# Patient Record
Sex: MALE | Race: WHITE | NOT HISPANIC OR LATINO | Employment: OTHER | URBAN - NONMETROPOLITAN AREA
[De-identification: names, ages, dates, MRNs, and addresses within clinical notes are randomized per-mention and may not be internally consistent; named-entity substitution may affect disease eponyms.]

---

## 2022-12-27 DIAGNOSIS — M25.561 PAIN IN BOTH KNEES, UNSPECIFIED CHRONICITY: Primary | ICD-10-CM

## 2022-12-27 DIAGNOSIS — M25.562 PAIN IN BOTH KNEES, UNSPECIFIED CHRONICITY: Primary | ICD-10-CM

## 2023-01-16 ENCOUNTER — HOSPITAL ENCOUNTER (OUTPATIENT)
Dept: GENERAL RADIOLOGY | Facility: HOSPITAL | Age: 68
Discharge: HOME | End: 2023-01-16
Admitting: ORTHOPAEDIC SURGERY
Payer: MEDICARE

## 2023-01-16 ENCOUNTER — OFFICE VISIT (OUTPATIENT)
Dept: ORTHOPEDIC SURGERY | Facility: CLINIC | Age: 68
End: 2023-01-16
Payer: MEDICARE

## 2023-01-16 VITALS
SYSTOLIC BLOOD PRESSURE: 123 MMHG | DIASTOLIC BLOOD PRESSURE: 69 MMHG | WEIGHT: 241.2 LBS | HEIGHT: 71 IN | BODY MASS INDEX: 33.77 KG/M2 | HEART RATE: 82 BPM

## 2023-01-16 DIAGNOSIS — M25.561 PAIN IN BOTH KNEES, UNSPECIFIED CHRONICITY: ICD-10-CM

## 2023-01-16 DIAGNOSIS — M17.0 PRIMARY OSTEOARTHRITIS OF BOTH KNEES: Primary | ICD-10-CM

## 2023-01-16 DIAGNOSIS — M25.562 PAIN IN BOTH KNEES, UNSPECIFIED CHRONICITY: ICD-10-CM

## 2023-01-16 PROCEDURE — 73562 X-RAY EXAM OF KNEE 3: CPT

## 2023-01-16 PROCEDURE — 20610 DRAIN/INJ JOINT/BURSA W/O US: CPT | Performed by: ORTHOPAEDIC SURGERY

## 2023-01-16 PROCEDURE — 99203 OFFICE O/P NEW LOW 30 MIN: CPT | Performed by: ORTHOPAEDIC SURGERY

## 2023-01-16 PROCEDURE — 73562 X-RAY EXAM OF KNEE 3: CPT | Performed by: RADIOLOGY

## 2023-01-16 RX ORDER — TRIAMTERENE AND HYDROCHLOROTHIAZIDE 37.5; 25 MG/1; MG/1
TABLET ORAL
COMMUNITY
Start: 2022-12-27

## 2023-01-16 RX ORDER — HYDROCODONE BITARTRATE AND ACETAMINOPHEN 10; 325 MG/1; MG/1
TABLET ORAL
COMMUNITY
Start: 2022-12-23

## 2023-01-16 RX ORDER — OMEPRAZOLE 40 MG/1
CAPSULE, DELAYED RELEASE ORAL
COMMUNITY
Start: 2022-12-20

## 2023-01-16 RX ORDER — AMLODIPINE BESYLATE 10 MG/1
TABLET ORAL
COMMUNITY
Start: 2022-12-20

## 2023-01-16 RX ORDER — TRAMADOL HYDROCHLORIDE 50 MG/1
TABLET ORAL
COMMUNITY
Start: 2022-12-20

## 2023-01-16 RX ORDER — LISINOPRIL 20 MG/1
TABLET ORAL
COMMUNITY
Start: 2023-01-02

## 2023-01-16 RX ORDER — GABAPENTIN 100 MG/1
CAPSULE ORAL
COMMUNITY
Start: 2022-11-15

## 2023-01-16 RX ADMIN — LIDOCAINE HYDROCHLORIDE 3 ML: 10 INJECTION, SOLUTION EPIDURAL; INFILTRATION; INTRACAUDAL; PERINEURAL at 12:01

## 2023-01-16 RX ADMIN — METHYLPREDNISOLONE ACETATE 80 MG: 80 INJECTION, SUSPENSION INTRA-ARTICULAR; INTRALESIONAL; INTRAMUSCULAR; SOFT TISSUE at 12:02

## 2023-01-16 RX ADMIN — METHYLPREDNISOLONE ACETATE 80 MG: 80 INJECTION, SUSPENSION INTRA-ARTICULAR; INTRALESIONAL; INTRAMUSCULAR; SOFT TISSUE at 12:01

## 2023-01-16 RX ADMIN — LIDOCAINE HYDROCHLORIDE 3 ML: 10 INJECTION, SOLUTION EPIDURAL; INFILTRATION; INTRACAUDAL; PERINEURAL at 12:02

## 2023-01-16 NOTE — PROGRESS NOTES
New Patient Visit      Patient: Bernardo Burnett  YOB: 1955  Date of Encounter: 01/16/2023        Chief Complaint:   Chief Complaint   Patient presents with   • Right Knee - Initial Evaluation, Pain   • Left Knee - Initial Evaluation, Pain           HPI:   Bernardo Burnett, 67 y.o. male, referred by Christine Carlson DO presents for evaluation of bilateral knee pain progressing over many years but became much worse this past summer while mowing grass his right knee buckled.  His pain was sharp and severe but has improved slightly.  He also has similar complaints to his left knee complaining of catching sensation without true locking.  His pain is worse with weightbearing.  He has received numerous steroid injections over the years with moderate response.  He does not experience weakness or numbness to either leg and denies back pain.  He has undergone left total hip replacement and is doing well.  Medical history includes diabetes and cancer involving his kidney which required nephrectomy.  Social history is remarkable for smokeless tobacco.  Alcohol is negative.        Active Problem List:  Patient Active Problem List   Diagnosis   • Primary osteoarthritis of both knees           Past Medical History:  Past Medical History:   Diagnosis Date   • Arthritis    • Back pain    • Cancer of kidney (HCC)    • Claustrophobia    • Diabetes (HCC)    • Hearing loss    • Hypertension            Past Surgical History:  Past Surgical History:   Procedure Laterality Date   • HIP SURGERY      replacement   • NEPHRECTOMY     • THYROIDECTOMY, PARTIAL             Family History:  Family History   Problem Relation Age of Onset   • Hypertension Mother    • Diabetes Father    • Skin cancer Father          Social History:  Social History     Socioeconomic History   • Marital status:    Tobacco Use   • Smoking status: Never   • Smokeless tobacco: Current     Types: Snuff   Vaping Use   • Vaping Use: Never used   Substance  "and Sexual Activity   • Alcohol use: Not Currently   • Drug use: Never   • Sexual activity: Never     Body mass index is 33.64 kg/m².      Medications:  Current Outpatient Medications   Medication Sig Dispense Refill   • amLODIPine (NORVASC) 10 MG tablet      • gabapentin (NEURONTIN) 100 MG capsule      • HYDROcodone-acetaminophen (NORCO)  MG per tablet      • lisinopril (PRINIVIL,ZESTRIL) 20 MG tablet      • metFORMIN (GLUCOPHAGE) 500 MG tablet      • omeprazole (priLOSEC) 40 MG capsule      • traMADol (ULTRAM) 50 MG tablet      • triamterene-hydrochlorothiazide (MAXZIDE-25) 37.5-25 MG per tablet        No current facility-administered medications for this visit.         Allergies:  No Known Allergies      Review of Systems:   Review of Systems   Constitutional: Positive for activity change and unexpected weight change.   HENT: Negative.    Eyes: Negative.    Respiratory: Positive for apnea.    Cardiovascular: Positive for palpitations and leg swelling.   Gastrointestinal: Negative.    Endocrine: Negative.    Genitourinary: Negative.    Musculoskeletal: Positive for arthralgias, back pain and neck pain.   Skin: Negative.    Allergic/Immunologic: Negative.    Neurological: Positive for numbness.   Hematological: Bruises/bleeds easily.   Psychiatric/Behavioral: Negative.          Physical Exam:   Physical Exam  GENERAL: 67 y.o. male, alert and oriented X 3 in no acute distress.   Visit Vitals  /69   Pulse 82   Ht 180.3 cm (71\")   Wt 109 kg (241 lb 3.2 oz)   BMI 33.64 kg/m²       GENERAL APPEARANCE: Awake, alert & oriented, in no acute distress and well developed, well nourished.   PSYCH: Normal mood and affect  LUNGS: Breathing nonlabored, no wheezing  EYES: Sclera anicteric, pupils equal  CARDIOVASCULAR: Palpable pulses. Capillary refill less than 2 seconds  INTEGUMENTARY: Skin intact, co clubbing, cyanosis  NEUROLOGIC: Normal Sensation      Musculoskeletal:   Examination bilateral knees reveals " approximately 8 degree flexion contractures bilaterally with moderate effusion left knee and mild effusion right knee has both medial and lateral joint line tenderness bilaterally both knees flex to approximately 110 degrees.  There is no gross instability with normal patellar tracking.  Neurovascular exam grossly intact.      Radiology/Labs:     XR Knee 3 View Bilateral    Result Date: 1/16/2023    Moderate to severe tricompartmental osteoarthritis.  This report was finalized on 1/16/2023 2:18 PM by Dr. Jordin Rosa MD.        Radiographs of knees reviewed today are similar in appearance with.  greatest involving the medial compartments bilaterally with near bone-on-bone osteoarthritis.      Assessment & Plan:   67 y.o. male presents with bilateral knee pain of several years duration with radiographs identifying bicompartmental osteoarthritis with mild varus alignment.  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  Today he is treated with aspiration removing 20 cc left knee 8 cc right knee injecting Depo-Medrol 80 mg intra-articular with lidocaine block.  He will return in 3 months.        ICD-10-CM ICD-9-CM   1. Primary osteoarthritis of both knees  M17.0 715.16         Large Joint Arthrocentesis: L knee  Date/Time: 1/16/2023 12:01 PM  Consent given by: patient  Site marked: site marked  Timeout: Immediately prior to procedure a time out was called to verify the correct patient, procedure, equipment, support staff and site/side marked as required   Supporting Documentation  Indications: pain   Procedure Details  Location: knee - L knee  Preparation: Patient was prepped and draped in the usual sterile fashion  Needle size: 18 G  Approach: anterolateral  Medications administered: 3 mL lidocaine PF 1% 1 %; 80 mg methylPREDNISolone acetate 80 MG/ML  Aspirate amount: 20 mL  Aspirate: serous  Patient tolerance: patient tolerated the  procedure well with no immediate complications    Large Joint Arthrocentesis: R knee  Date/Time: 1/16/2023 12:02 PM  Consent given by: patient  Site marked: site marked  Timeout: Immediately prior to procedure a time out was called to verify the correct patient, procedure, equipment, support staff and site/side marked as required   Supporting Documentation  Indications: pain and joint swelling   Procedure Details  Location: knee - R knee  Preparation: Patient was prepped and draped in the usual sterile fashion  Needle size: 18 G  Approach: anterolateral  Medications administered: 3 mL lidocaine PF 1% 1 %; 80 mg methylPREDNISolone acetate 80 MG/ML  Aspirate amount: 8 mL  Aspirate: serous  Patient tolerance: patient tolerated the procedure well with no immediate complications            Cc:   Christine Carlson DO                This document has been electronically signed by Afshin Garcia MD   January 17, 2023 12:03 EST

## 2023-01-17 PROBLEM — M17.0 PRIMARY OSTEOARTHRITIS OF BOTH KNEES: Status: ACTIVE | Noted: 2023-01-17

## 2023-01-20 RX ORDER — LIDOCAINE HYDROCHLORIDE 10 MG/ML
3 INJECTION, SOLUTION EPIDURAL; INFILTRATION; INTRACAUDAL; PERINEURAL
Status: COMPLETED | OUTPATIENT
Start: 2023-01-16 | End: 2023-01-16

## 2023-01-20 RX ORDER — METHYLPREDNISOLONE ACETATE 80 MG/ML
80 INJECTION, SUSPENSION INTRA-ARTICULAR; INTRALESIONAL; INTRAMUSCULAR; SOFT TISSUE
Status: COMPLETED | OUTPATIENT
Start: 2023-01-16 | End: 2023-01-16

## 2024-05-07 DIAGNOSIS — M17.0 PRIMARY OSTEOARTHRITIS OF BOTH KNEES: Primary | ICD-10-CM
